# Patient Record
Sex: MALE
[De-identification: names, ages, dates, MRNs, and addresses within clinical notes are randomized per-mention and may not be internally consistent; named-entity substitution may affect disease eponyms.]

---

## 2020-04-21 PROBLEM — Z00.00 ENCOUNTER FOR PREVENTIVE HEALTH EXAMINATION: Status: ACTIVE | Noted: 2020-04-21

## 2020-04-30 ENCOUNTER — TRANSCRIPTION ENCOUNTER (OUTPATIENT)
Age: 30
End: 2020-04-30

## 2020-04-30 ENCOUNTER — APPOINTMENT (OUTPATIENT)
Dept: OTOLARYNGOLOGY | Facility: CLINIC | Age: 30
End: 2020-04-30
Payer: COMMERCIAL

## 2020-04-30 DIAGNOSIS — H93.8X2 OTHER SPECIFIED DISORDERS OF LEFT EAR: ICD-10-CM

## 2020-04-30 DIAGNOSIS — K21.9 GASTRO-ESOPHAGEAL REFLUX DISEASE W/OUT ESOPHAGITIS: ICD-10-CM

## 2020-04-30 PROCEDURE — 99203 OFFICE O/P NEW LOW 30 MIN: CPT | Mod: 95

## 2020-04-30 NOTE — HISTORY OF PRESENT ILLNESS
[Home] : at home, [unfilled] , at the time of the visit. [Medical Office: ([facility])___] : at the medical office located in  [de-identified] : 29-year-old male who noted several weeks ago that his left ear has become clogged.  At the time he noted difficulty with his hearing.  Has no history of problem with wax in the past denied any fever pain no history of tinnitus and has no dizziness or vertigo.  He has never had similar symptoms in the past.  He spoke to a friend of his who is a physician who suggested putting peroxide inside his ear.  He has been using that intermittently over the last few weeks and notes some improvement in his hearing but still feels that slightly clogged.  As noted at the present time it is slightly better however he is concerned about the situation.\par He has no history of previous ear surgery infections or wax problems in the past.\par He also complained of the dryness and soreness in his throat which she has been treated by using Mucinex and Sudafed and states that when he uses it he feels better he does have a history.  He does have a history of reflux in the past and has been intermittently taking his omeprazole.  As noted above he has not had any fever or difficulty swallowing or any other symptoms